# Patient Record
Sex: FEMALE | Race: WHITE
[De-identification: names, ages, dates, MRNs, and addresses within clinical notes are randomized per-mention and may not be internally consistent; named-entity substitution may affect disease eponyms.]

---

## 2019-12-30 ENCOUNTER — APPOINTMENT (OUTPATIENT)
Dept: CARDIOLOGY | Facility: CLINIC | Age: 44
End: 2019-12-30
Payer: COMMERCIAL

## 2019-12-30 PROCEDURE — 93000 ELECTROCARDIOGRAM COMPLETE: CPT

## 2019-12-30 PROCEDURE — 99204 OFFICE O/P NEW MOD 45 MIN: CPT

## 2020-02-20 PROBLEM — Z00.00 ENCOUNTER FOR PREVENTIVE HEALTH EXAMINATION: Status: ACTIVE | Noted: 2020-02-20

## 2020-02-25 ENCOUNTER — APPOINTMENT (OUTPATIENT)
Dept: CARDIOLOGY | Facility: CLINIC | Age: 45
End: 2020-02-25

## 2022-10-11 ENCOUNTER — APPOINTMENT (OUTPATIENT)
Dept: OBGYN | Facility: CLINIC | Age: 47
End: 2022-10-11

## 2022-10-11 VITALS
SYSTOLIC BLOOD PRESSURE: 84 MMHG | WEIGHT: 110 LBS | HEART RATE: 66 BPM | BODY MASS INDEX: 21.6 KG/M2 | HEIGHT: 60 IN | TEMPERATURE: 97.2 F | DIASTOLIC BLOOD PRESSURE: 56 MMHG

## 2022-10-11 DIAGNOSIS — N94.9 UNSPECIFIED CONDITION ASSOCIATED WITH FEMALE GENITAL ORGANS AND MENSTRUAL CYCLE: ICD-10-CM

## 2022-10-11 DIAGNOSIS — Z78.9 OTHER SPECIFIED HEALTH STATUS: ICD-10-CM

## 2022-10-11 DIAGNOSIS — R23.2 FLUSHING: ICD-10-CM

## 2022-10-11 DIAGNOSIS — R89.6 ABNORMAL CYTOLOGICAL FINDINGS IN SPECIMENS FROM OTHER ORGANS, SYSTEMS AND TISSUES: ICD-10-CM

## 2022-10-11 DIAGNOSIS — Z01.419 ENCOUNTER FOR GYNECOLOGICAL EXAMINATION (GENERAL) (ROUTINE) W/OUT ABNORMAL FINDINGS: ICD-10-CM

## 2022-10-11 DIAGNOSIS — Z80.0 FAMILY HISTORY OF MALIGNANT NEOPLASM OF DIGESTIVE ORGANS: ICD-10-CM

## 2022-10-11 DIAGNOSIS — R39.15 URGENCY OF URINATION: ICD-10-CM

## 2022-10-11 DIAGNOSIS — Z87.891 PERSONAL HISTORY OF NICOTINE DEPENDENCE: ICD-10-CM

## 2022-10-11 PROCEDURE — 76830 TRANSVAGINAL US NON-OB: CPT

## 2022-10-11 PROCEDURE — 99386 PREV VISIT NEW AGE 40-64: CPT

## 2022-10-11 NOTE — DISCUSSION/SUMMARY
[FreeTextEntry1] : 46 yo p1 for annual exam, adnexal fullness right , rule out overactive bladder\par pap,  hpv\par mammogram\par coloscopy \par plevic sono-small fibroids, small right ovarian cyst simple\par Oxybutynin 5 mg prescribed to try\par

## 2022-10-11 NOTE — PROCEDURE
[Cervical Pap Smear] : cervical Pap smear [Liquid Base] : liquid base [Tolerated Well] : the patient tolerated the procedure well [No Complications] : there were no complications [Fibroid Uterus] : fibroid uterus [Transvaginal Ultrasound] : transvaginal ultrasound [FreeTextEntry9] : Adnexal fullness [FreeTextEntry4] : Normal uterus small fibroid normal endometrial stripe, small right ovarian simple cyst, negative left adnexal

## 2022-10-11 NOTE — HISTORY OF PRESENT ILLNESS
[Patient reported colonoscopy was normal] : Patient reported colonoscopy was normal [N] : Patient reports normal menses [Y] : Patient is sexually active [Patient reported mammogram was normal] : Patient reported mammogram was normal [Patient reported PAP Smear was normal] : Patient reported PAP Smear was normal [TextBox_4] : GYNHX\par No history of fibroids, cysts, or STDs\par last pap 2021 wnl  [Mammogramdate] : 2021 [PapSmeardate] : 2021 [ColonoscopyDate] : 3-2019 [PGxTotal] : 1 [[facility]] : [facility] [[facility]] : 1 [FreeTextEntry1] :

## 2022-10-11 NOTE — PHYSICAL EXAM
[Appropriately responsive] : appropriately responsive [Alert] : alert [No Acute Distress] : no acute distress [No Lymphadenopathy] : no lymphadenopathy [Soft] : soft [Non-tender] : non-tender [Non-distended] : non-distended [No HSM] : No HSM [No Lesions] : no lesions [No Mass] : no mass [Oriented x3] : oriented x3 [Examination Of The Breasts] : a normal appearance [No Discharge] : no discharge [No Masses] : no breast masses were palpable [Labia Majora] : normal [Labia Minora] : normal [Normal] : normal [Normal Position] : in a normal position [No Bleeding] : There was no active vaginal bleeding [Uterine Adnexae] : normal  [FreeTextEntry6] : Slight right adnexal fullness palpated

## 2022-10-13 LAB — HPV HIGH+LOW RISK DNA PNL CVX: NOT DETECTED

## 2022-10-25 LAB — CYTOLOGY CVX/VAG DOC THIN PREP: NORMAL

## 2023-01-18 ENCOUNTER — NON-APPOINTMENT (OUTPATIENT)
Age: 48
End: 2023-01-18

## 2023-01-18 DIAGNOSIS — N64.89 OTHER SPECIFIED DISORDERS OF BREAST: ICD-10-CM

## 2023-12-22 ENCOUNTER — APPOINTMENT (OUTPATIENT)
Dept: CARDIOLOGY | Facility: CLINIC | Age: 48
End: 2023-12-22
Payer: COMMERCIAL

## 2023-12-22 VITALS
OXYGEN SATURATION: 98 % | SYSTOLIC BLOOD PRESSURE: 112 MMHG | WEIGHT: 107 LBS | BODY MASS INDEX: 21.01 KG/M2 | HEART RATE: 84 BPM | HEIGHT: 60 IN | DIASTOLIC BLOOD PRESSURE: 72 MMHG

## 2023-12-22 PROCEDURE — 99204 OFFICE O/P NEW MOD 45 MIN: CPT

## 2023-12-22 RX ORDER — METOPROLOL TARTRATE 25 MG/1
25 TABLET, FILM COATED ORAL
Qty: 2 | Refills: 0 | Status: ACTIVE | COMMUNITY
Start: 2023-12-22 | End: 1900-01-01

## 2023-12-22 RX ORDER — OXYBUTYNIN CHLORIDE 5 MG/1
5 TABLET ORAL
Qty: 15 | Refills: 2 | Status: DISCONTINUED | COMMUNITY
Start: 2022-10-11 | End: 2023-12-22

## 2023-12-22 NOTE — REVIEW OF SYSTEMS
[Anxiety] : anxiety [Fever] : no fever [Headache] : no headache [Chills] : no chills [Feeling Fatigued] : not feeling fatigued [SOB] : no shortness of breath [Dyspnea on exertion] : not dyspnea during exertion [Chest Discomfort] : no chest discomfort [Palpitations] : no palpitations [Cough] : no cough [Wheezing] : no wheezing [Abdominal Pain] : no abdominal pain [Nausea] : no nausea [Vomiting] : no vomiting [Heartburn] : no heartburn [Dysuria] : no dysuria [Joint Pain] : no joint pain [Dizziness] : no dizziness [de-identified] : sleep deprivation

## 2023-12-22 NOTE — HISTORY OF PRESENT ILLNESS
[FreeTextEntry1] : 48 y.o F with PMHx of panic attacks starting in 2020, presenting today to the clinic for initial evaluation.  12/22/2023 She is complaining of intermittent chest pain, self-terminating after 15 minutes that started 1 month ago.  She reports CP as non-exertional, non-reproducible, non-pleuritic in nature. No reports of heartburn/palpitations/LE edema She also states that she was born with a cardiac "problem" but not able to name it/characterize it.  She f/u with Dr. Bynum. Does not take any medications. Occasional smoker and alcohol intake. No ILD. FHx of father with PCI  === Data reviewed today === Vitals /72 - HR 84  Previous Lab work done at Dr. Bynum's office 09/26/23 A1c 5.0 - Lipid profile cholesterol 47788 - LDL 91 - HDL 65 EKG showed NSR.

## 2023-12-22 NOTE — ASSESSMENT
[FreeTextEntry1] : 48 y.o F with PMHx of panic attacks starting in 2020, presenting today to the clinic for initial evaluation.  #Intermittent chest pain (atypical)  #ASCVD score 0.5%  RF; FHx (father) - smoking - CCTA  - follow-up after scan

## 2023-12-22 NOTE — PHYSICAL EXAM
[General Appearance - Well Developed] : well developed [Normal Appearance] : normal appearance [General Appearance - Well Nourished] : well nourished [Heart Rate And Rhythm] : heart rate and rhythm were normal [Heart Sounds] : normal S1 and S2 [Murmurs] : no murmurs present [Arterial Pulses Normal] : the arterial pulses were normal [] : no respiratory distress [Respiration, Rhythm And Depth] : normal respiratory rhythm and effort [Exaggerated Use Of Accessory Muscles For Inspiration] : no accessory muscle use [Abdomen Soft] : soft [Abdomen Tenderness] : non-tender [Skin Turgor] : normal skin turgor [Affect] : the affect was normal [Oriented To Time, Place, And Person] : oriented to person, place, and time

## 2023-12-22 NOTE — ADDENDUM
[FreeTextEntry1] : For a New Patient:  I, Santiago Delvalle, personally performed the evaluation and management (E/M) services for this new patient. That E/M includes conducting the clinically appropriate initial history &/or exam, assessing all conditions, and establishing the plan of care. Today, my medical resident, Светлана Anna, was here to observe &/or participate in the visit & follow plan of care established by me.

## 2024-01-31 ENCOUNTER — APPOINTMENT (OUTPATIENT)
Dept: CARDIOLOGY | Facility: CLINIC | Age: 49
End: 2024-01-31
Payer: COMMERCIAL

## 2024-01-31 DIAGNOSIS — R07.89 OTHER CHEST PAIN: ICD-10-CM

## 2024-01-31 PROCEDURE — 93015 CV STRESS TEST SUPVJ I&R: CPT

## 2024-02-01 PROBLEM — R07.89 NON-CARDIAC CHEST PAIN: Status: ACTIVE | Noted: 2023-12-22

## 2024-10-09 ENCOUNTER — APPOINTMENT (OUTPATIENT)
Dept: UROGYNECOLOGY | Facility: CLINIC | Age: 49
End: 2024-10-09
Payer: COMMERCIAL

## 2024-10-09 VITALS
WEIGHT: 108 LBS | BODY MASS INDEX: 21.2 KG/M2 | HEART RATE: 68 BPM | SYSTOLIC BLOOD PRESSURE: 95 MMHG | HEIGHT: 60 IN | DIASTOLIC BLOOD PRESSURE: 69 MMHG

## 2024-10-09 DIAGNOSIS — K59.09 OTHER CONSTIPATION: ICD-10-CM

## 2024-10-09 DIAGNOSIS — N94.10 UNSPECIFIED DYSPAREUNIA: ICD-10-CM

## 2024-10-09 DIAGNOSIS — R35.1 NOCTURIA: ICD-10-CM

## 2024-10-09 DIAGNOSIS — R10.2 PELVIC AND PERINEAL PAIN: ICD-10-CM

## 2024-10-09 DIAGNOSIS — Z87.440 PERSONAL HISTORY OF URINARY (TRACT) INFECTIONS: ICD-10-CM

## 2024-10-09 DIAGNOSIS — R39.15 URGENCY OF URINATION: ICD-10-CM

## 2024-10-09 PROCEDURE — 51701 INSERT BLADDER CATHETER: CPT

## 2024-10-09 PROCEDURE — 99459 PELVIC EXAMINATION: CPT

## 2024-10-09 PROCEDURE — 99205 OFFICE O/P NEW HI 60 MIN: CPT | Mod: 25

## 2024-10-10 LAB
APPEARANCE: CLEAR
BILIRUBIN URINE: NEGATIVE
BLOOD URINE: NEGATIVE
COLOR: YELLOW
GLUCOSE QUALITATIVE U: NEGATIVE MG/DL
KETONES URINE: ABNORMAL MG/DL
LEUKOCYTE ESTERASE URINE: NEGATIVE
NITRITE URINE: NEGATIVE
PH URINE: 6
PROTEIN URINE: NORMAL MG/DL
SPECIFIC GRAVITY URINE: 1.03
UROBILINOGEN URINE: 0.2 MG/DL

## 2024-10-11 PROBLEM — N94.10 DYSPAREUNIA IN FEMALE: Status: ACTIVE | Noted: 2024-10-11

## 2024-10-11 PROBLEM — R10.2 FEMALE PELVIC PAIN: Status: ACTIVE | Noted: 2024-10-11

## 2024-10-11 PROBLEM — K59.09 CHRONIC CONSTIPATION: Status: ACTIVE | Noted: 2024-10-11

## 2024-10-11 PROBLEM — R35.1 NOCTURIA: Status: ACTIVE | Noted: 2024-10-11

## 2024-10-11 PROBLEM — Z87.440 HISTORY OF UTI: Status: ACTIVE | Noted: 2024-10-11

## 2024-10-14 LAB — URINE CULTURE <10: NORMAL

## 2025-01-15 ENCOUNTER — APPOINTMENT (OUTPATIENT)
Dept: UROGYNECOLOGY | Facility: CLINIC | Age: 50
End: 2025-01-15